# Patient Record
Sex: FEMALE | Race: BLACK OR AFRICAN AMERICAN | NOT HISPANIC OR LATINO | URBAN - METROPOLITAN AREA
[De-identification: names, ages, dates, MRNs, and addresses within clinical notes are randomized per-mention and may not be internally consistent; named-entity substitution may affect disease eponyms.]

---

## 2023-07-08 ENCOUNTER — INPATIENT (INPATIENT)
Facility: HOSPITAL | Age: 68
LOS: 2 days | Discharge: HOME | End: 2023-07-11
Attending: INTERNAL MEDICINE | Admitting: INTERNAL MEDICINE
Payer: MEDICARE

## 2023-07-08 VITALS
OXYGEN SATURATION: 98 % | DIASTOLIC BLOOD PRESSURE: 71 MMHG | RESPIRATION RATE: 18 BRPM | HEART RATE: 72 BPM | SYSTOLIC BLOOD PRESSURE: 125 MMHG | TEMPERATURE: 98 F

## 2023-07-08 LAB
ALBUMIN SERPL ELPH-MCNC: 4.5 G/DL — SIGNIFICANT CHANGE UP (ref 3.3–5)
ALP SERPL-CCNC: 65 U/L — SIGNIFICANT CHANGE UP (ref 40–120)
ALT FLD-CCNC: 14 U/L — SIGNIFICANT CHANGE UP (ref 4–33)
ANION GAP SERPL CALC-SCNC: 14 MMOL/L — SIGNIFICANT CHANGE UP (ref 7–14)
APTT BLD: 27.7 SEC — SIGNIFICANT CHANGE UP (ref 27–36.3)
AST SERPL-CCNC: 17 U/L — SIGNIFICANT CHANGE UP (ref 4–32)
BASOPHILS # BLD AUTO: 0.08 K/UL — SIGNIFICANT CHANGE UP (ref 0–0.2)
BASOPHILS NFR BLD AUTO: 0.9 % — SIGNIFICANT CHANGE UP (ref 0–2)
BILIRUB SERPL-MCNC: 0.3 MG/DL — SIGNIFICANT CHANGE UP (ref 0.2–1.2)
BUN SERPL-MCNC: 16 MG/DL — SIGNIFICANT CHANGE UP (ref 7–23)
CALCIUM SERPL-MCNC: 8.9 MG/DL — SIGNIFICANT CHANGE UP (ref 8.4–10.5)
CHLORIDE SERPL-SCNC: 100 MMOL/L — SIGNIFICANT CHANGE UP (ref 98–107)
CO2 SERPL-SCNC: 25 MMOL/L — SIGNIFICANT CHANGE UP (ref 22–31)
CREAT SERPL-MCNC: 1.15 MG/DL — SIGNIFICANT CHANGE UP (ref 0.5–1.3)
EGFR: 52 ML/MIN/1.73M2 — LOW
EOSINOPHIL # BLD AUTO: 0.22 K/UL — SIGNIFICANT CHANGE UP (ref 0–0.5)
EOSINOPHIL NFR BLD AUTO: 2.6 % — SIGNIFICANT CHANGE UP (ref 0–6)
GLUCOSE SERPL-MCNC: 90 MG/DL — SIGNIFICANT CHANGE UP (ref 70–99)
HCT VFR BLD CALC: 37.3 % — SIGNIFICANT CHANGE UP (ref 34.5–45)
HGB BLD-MCNC: 12.1 G/DL — SIGNIFICANT CHANGE UP (ref 11.5–15.5)
IANC: 5.58 K/UL — SIGNIFICANT CHANGE UP (ref 1.8–7.4)
INR BLD: 1.09 RATIO — SIGNIFICANT CHANGE UP (ref 0.88–1.16)
LYMPHOCYTES # BLD AUTO: 2.11 K/UL — SIGNIFICANT CHANGE UP (ref 1–3.3)
LYMPHOCYTES # BLD AUTO: 25.2 % — SIGNIFICANT CHANGE UP (ref 13–44)
MANUAL SMEAR VERIFICATION: SIGNIFICANT CHANGE UP
MCHC RBC-ENTMCNC: 28.1 PG — SIGNIFICANT CHANGE UP (ref 27–34)
MCHC RBC-ENTMCNC: 32.4 GM/DL — SIGNIFICANT CHANGE UP (ref 32–36)
MCV RBC AUTO: 86.7 FL — SIGNIFICANT CHANGE UP (ref 80–100)
MONOCYTES # BLD AUTO: 0.22 K/UL — SIGNIFICANT CHANGE UP (ref 0–0.9)
MONOCYTES NFR BLD AUTO: 2.6 % — SIGNIFICANT CHANGE UP (ref 2–14)
NEUTROPHILS # BLD AUTO: 5.54 K/UL — SIGNIFICANT CHANGE UP (ref 1.8–7.4)
NEUTROPHILS NFR BLD AUTO: 66.1 % — SIGNIFICANT CHANGE UP (ref 43–77)
PLAT MORPH BLD: NORMAL — SIGNIFICANT CHANGE UP
PLATELET # BLD AUTO: 118 K/UL — LOW (ref 150–400)
PLATELET COUNT - ESTIMATE: NORMAL — SIGNIFICANT CHANGE UP
POTASSIUM SERPL-MCNC: 2.7 MMOL/L — CRITICAL LOW (ref 3.5–5.3)
POTASSIUM SERPL-SCNC: 2.7 MMOL/L — CRITICAL LOW (ref 3.5–5.3)
PROT SERPL-MCNC: 7.4 G/DL — SIGNIFICANT CHANGE UP (ref 6–8.3)
PROTHROM AB SERPL-ACNC: 12.6 SEC — SIGNIFICANT CHANGE UP (ref 10.5–13.4)
RBC # BLD: 4.3 M/UL — SIGNIFICANT CHANGE UP (ref 3.8–5.2)
RBC # FLD: 13.2 % — SIGNIFICANT CHANGE UP (ref 10.3–14.5)
RBC BLD AUTO: NORMAL — SIGNIFICANT CHANGE UP
SMUDGE CELLS # BLD: PRESENT — SIGNIFICANT CHANGE UP
SODIUM SERPL-SCNC: 139 MMOL/L — SIGNIFICANT CHANGE UP (ref 135–145)
TROPONIN T, HIGH SENSITIVITY RESULT: <6 NG/L — SIGNIFICANT CHANGE UP
VARIANT LYMPHS # BLD: 2.6 % — SIGNIFICANT CHANGE UP (ref 0–6)
WBC # BLD: 8.38 K/UL — SIGNIFICANT CHANGE UP (ref 3.8–10.5)
WBC # FLD AUTO: 8.38 K/UL — SIGNIFICANT CHANGE UP (ref 3.8–10.5)

## 2023-07-08 PROCEDURE — 70498 CT ANGIOGRAPHY NECK: CPT | Mod: 26,MA

## 2023-07-08 PROCEDURE — 0042T: CPT | Mod: MA

## 2023-07-08 PROCEDURE — 70496 CT ANGIOGRAPHY HEAD: CPT | Mod: 26,MA

## 2023-07-08 PROCEDURE — 99223 1ST HOSP IP/OBS HIGH 75: CPT | Mod: FS

## 2023-07-08 RX ORDER — ASPIRIN/CALCIUM CARB/MAGNESIUM 324 MG
81 TABLET ORAL DAILY
Refills: 0 | Status: DISCONTINUED | OUTPATIENT
Start: 2023-07-08 | End: 2023-07-11

## 2023-07-08 RX ORDER — POTASSIUM CHLORIDE 20 MEQ
40 PACKET (EA) ORAL ONCE
Refills: 0 | Status: COMPLETED | OUTPATIENT
Start: 2023-07-08 | End: 2023-07-08

## 2023-07-08 RX ORDER — TETANUS TOXOID, REDUCED DIPHTHERIA TOXOID AND ACELLULAR PERTUSSIS VACCINE, ADSORBED 5; 2.5; 8; 8; 2.5 [IU]/.5ML; [IU]/.5ML; UG/.5ML; UG/.5ML; UG/.5ML
0.5 SUSPENSION INTRAMUSCULAR ONCE
Refills: 0 | Status: COMPLETED | OUTPATIENT
Start: 2023-07-08 | End: 2023-07-08

## 2023-07-08 RX ORDER — SODIUM CHLORIDE 9 MG/ML
1000 INJECTION INTRAMUSCULAR; INTRAVENOUS; SUBCUTANEOUS ONCE
Refills: 0 | Status: COMPLETED | OUTPATIENT
Start: 2023-07-08 | End: 2023-07-08

## 2023-07-08 RX ORDER — POTASSIUM CHLORIDE 20 MEQ
10 PACKET (EA) ORAL
Refills: 0 | Status: COMPLETED | OUTPATIENT
Start: 2023-07-08 | End: 2023-07-09

## 2023-07-08 NOTE — ED ADULT NURSE NOTE - NSFALLRISKINTERV_ED_ALL_ED
Assistance OOB with selected safe patient handling equipment if applicable/Communicate fall risk and risk factors to all staff, patient, and family/Orthostatic vital signs/Provide visual cue: yellow wristband, yellow gown, etc/Reinforce activity limits and safety measures with patient and family/Call bell, personal items and telephone in reach/Instruct patient to call for assistance before getting out of bed/chair/stretcher/Non-slip footwear applied when patient is off stretcher/[place] to call system/Physically safe environment - no spills, clutter or unnecessary equipment/Purposeful Proactive Rounding/Room/bathroom lighting operational, light cord in reach

## 2023-07-08 NOTE — CONSULT NOTE ADULT - ASSESSMENT
Assessment  68-year-old woman with history of hypertension, hyperlipidemia, prior syncopal event 10 years ago from exertion while exercising at the gym presenting for new onset syncope and allegedly ?family member noted right facial droop upon awakening prior to ED arrival which resolved when she presented to the ED. Fingerstick 126, other labs pending. CT head preliminary read unremarkable. Patient not tenecteplase candidate due to no deficits on exam. Not mechanical thrombectomy candidate due to no LVO.    LKW: 8pm  NIHSS: 0  mRS: 0  ABCD2: 1 (age)    Impression  Loss of consciousness with ?resolved right facial droop concerning for TIA vs syncope pending further workup.    Recommendations  -Frequent re-evaluations  -Continuous telemetry monitoring  -Serial Cardiac Enzymes with EKG  -echo, further cardiac workup as appropriate  -orthostatics  -MRI brain w/o contrast  -continue home aspirin 81  -HbA1c, lipid panel    Case to be discussed with the attending in the morning. Assessment  68-year-old woman with history of hypertension, hyperlipidemia, prior syncopal event 10 years ago from exertion while exercising at the gym presenting for new onset syncope and allegedly ?family member noted right facial droop upon awakening prior to ED arrival which resolved when she presented to the ED. Fingerstick 126, other labs pending. CT head preliminary read unremarkable. Patient not tenecteplase candidate due to no deficits on exam. Not mechanical thrombectomy candidate due to no LVO.    LKW: 8pm  NIHSS: 0  mRS: 0  ABCD2: 1 (age)    Impression  Loss of consciousness with ?resolved right facial droop concerning for TIA vs syncope pending further workup.    Recommendations  -Frequent re-evaluations  -allow for permissive HTN up to 220/110 for 24 hours followed by gradual normotension  -Continuous telemetry monitoring  -Serial Cardiac Enzymes with EKG  -echo, further cardiac workup as appropriate  -orthostatics  -MRI brain w/o contrast  -continue home aspirin 81  -HbA1c, lipid panel    Case discussed with telestroke neurologist, to be seen by neuro team in the AM.

## 2023-07-08 NOTE — ED CDU PROVIDER INITIAL DAY NOTE - ATTENDING APP SHARED VISIT CONTRIBUTION OF CARE
67 yo female with PMH HTN, HLD s/p syncopal episode while at a cookout earlier, states she was feeling very hot, had a witnessed fall with head strike. Fam member reports brief episode of faical droop PE: Well appearing, RRR, CTA BL lungs, abd soft NTND, cerebellar intact, CN II-XII grossly normal, speech normal, no facial droop, normal gait. +hematoma noted on forehead wth no active bleeding A/P MR, neuro, reassess

## 2023-07-08 NOTE — CONSULT NOTE ADULT - ATTENDING COMMENTS
patient feels wee at present, neuro exam is non focal.  reports a similar episode 3 yrs ago, also in july, after a photoshoot felt a "wave like sensation" in her head and lost consciousness.   multiple witnesses at yesterdays event, no sz like activity reported. she denies and post ictal period, loss of B/B no tongue bite, no muscle aches. had another episode 10 yrs ago of syncope after working out  impression: Loss of consciousness with ?resolved right facial droop concerning for TIA vs syncope pending further workup. similar episode 3 yrs ago also cannot r/o syncope vs sz.   plan  as above but will get MRI brain with and without cole  cardiac w/u  EEG can be done in patient or outpatient.   D/w patient and CDU staff patient feels wee at present, neuro exam is non focal.  reports a similar episode 3 yrs ago, also in july, after a photo shoot felt a "wave like sensation" in her head and lost consciousness.   multiple witnesses at yesterdays event, no sz like activity reported. she denies and post ictal period, loss of B/B no tongue bite, no muscle aches. had another episode 10 yrs ago of syncope after working out  impression: Loss of consciousness with ?resolved right facial droop concerning for TIA vs syncope pending further workup. similar episode 3 yrs ago also cannot r/o syncope vs sz.   plan  as above but will get MRI brain with and without cole  cardiac w/u  EEG

## 2023-07-08 NOTE — CONSULT NOTE ADULT - SUBJECTIVE AND OBJECTIVE BOX
Neurology - Consult Note    Spectra: [phone] ([facility]), [phone] ([facility])    HPI: Diana Naranjo is a 68-year-old woman with history of hypertension, hyperlipidemia, prior syncopal event 10 years ago from exertion while exercising at the gym presenting for new onset syncope and allegedly ?family member noted right facial droop upon awakening prior to ED arrival which resolved when she presented to the ED.    Patient seen and evaluated at bedside. Reports feeling well. Denies pain. Patient reports she was at her niece's birthday party when she lost consciousness, reports for a few minutes. She states she was taking photos and standing for a prolonged period of time. Felt hot in her head prior to losing consciousness. Denies confusion. Denies muscle fatigue, headaches, weakness/tingling/difficulties with swallowing/shortness of breath/palpitations/vision changes. Upon awaking, did not know where she was or what happened. Patient reports she is taking amlodipine, chlorthalidone and baby aspirin for preventative reasons.    LKW: 8pm  NIHSS: 0  mRS: 0  ABCD2: 1 (age)    Review of Systems:  As mentioned in HPI.  All other review of systems is negative unless indicated above.    Allergies:  No Known Allergies    PMHx/PSHx/Family Hx: As above, otherwise see below     Vitals:  T(C): 36.4 (07-08-23 @ 20:54), Max: 36.4 (07-08-23 @ 20:54)  HR: 72 (07-08-23 @ 20:54) (72 - 72)  BP: 125/71 (07-08-23 @ 20:54) (125/71 - 125/71)  RR: 18 (07-08-23 @ 20:54) (18 - 18)  SpO2: 98% (07-08-23 @ 20:54) (98% - 98%)    Physical Examination:   General - non-toxic appearing female in no acute distress    Neurologic Exam:  Mental status - Awake, Alert, Oriented to person, place, and time. Speech fluent, repetition intact. Follows simple commands.    Cranial nerves - PERRLA (4mm -> 3mm b/l), VFF, EOMI, face sensation (V1-V3) intact b/l, facial strength intact without asymmetry b/l, hearing intact b/l, palate with symmetric elevation, trapezius 5/5 strength b/l, tongue midline on protrusion with full lateral movement    Motor - Normal bulk and tone throughout. No pronator drift.  Strength testing            Deltoid      Biceps      Triceps             R            5                 5               5                  5  L             5                 5               5                   5              Hip Flexion       Knee Flexion    Knee Extension    Dorsiflexion    Plantar Flexion  R              5                         5                       5                           5                            5                           L              5                         5                        5                           5                            5                          Sensation - Light touch intact  Coordination - Finger to Nose intact b/l. No tremors appreciated    Gait and station - Normal casual gait    Labs: pending  CAPILLARY BLOOD GLUCOSE  POCT Blood Glucose.: 126 mg/dL (08 Jul 2023 21:04)    Radiology:  CT head prelim read unremarkable.  -no hemorrhage  -hyperdensities of basal ganglia  -?potential small old lacunar in left frontal

## 2023-07-08 NOTE — ED PROVIDER NOTE - OBJECTIVE STATEMENT
69 y/o F with h/o HTN, HLD s/p sudden onset syncopal episode 1 hour PTA as she was standing up from a bench in a hot environment with many people and hit her head on the concrete with the fall. As per daughter upon awakening, Mom just "stared" and a right facial droop was present that resolved upon arrival to hospital. No recent illness nor travel. Denies f/c, dizziness, CP, SOB, abdominal pain, n/v/d, dysuria, weakness, numbness, tingling. Large frontal hematoma noted. Patient is   A+Ox3, answering all questions and following commands appropriately. Although sx's resolved and NIH 0, code stroke called due to onset and window of sx's. Accompanied with daughter.

## 2023-07-08 NOTE — ED PROVIDER NOTE - ATTENDING APP SHARED VISIT CONTRIBUTION OF CARE
67 yo female with PMH HTN, HLD s/p syncopal episode while at a cookout earlier, states she was feeling very hot, had a witnessed fall with head strike. Fam member reports brief episode of faical droop PE: Well appearing, RRR, CTA BL lungs, abd soft NTND, cerebellar intact, CN II-XII grossly normal, speech normal, no facial droop, normal gait. +hematoma noted on forehead wth no active bleeding A/P Labs, imaging, medicate, reassess

## 2023-07-08 NOTE — ED ADULT TRIAGE NOTE - CHIEF COMPLAINT QUOTE
Patient c/o syncopal episode x 1 hour ago. States she was eating, felt dizzy and fell out of chair onto knees. Abrasion noted to left side of forehead, bleeding controlled. Denies dizziness, chest pain and SOB. Hx. HTN and HLD. Arrives with 20G IV to left AC.

## 2023-07-08 NOTE — ED ADULT NURSE NOTE - OBJECTIVE STATEMENT
Trice RN: 68 year old female received in intake rm8 as code stroke. Pt brought to CT area immediately. Pt AAOx4 ambulatory c/o syncopal episode stating "I felt a lot of heat in my head, I was eating then my vision got blurry and that's the last thing I remember". +head strike, swelling and abrasion noted to left forehead. Pt denies AC use. Pt denies numbness/weakness at this time. Steady gait noted. +ROM to all extremities. Pt denies dizziness/headache/chest pain/shortness of breath/n/v/d/fever. Respirations even and unlabored. PIV #22 left AC by EMS. PIV #18 right AC, labs drawn and sent. VS as noted. Neuro at bedside. Bed in lowest position, safety maintained. Trice RN: 68 year old female received in intake rm8 as code stroke. Pt brought to CT area immediately. LKW 7/8/23 20:00. Pt AAOx4 ambulatory c/o syncopal episode stating "I felt a lot of heat in my head, I was eating then my vision got blurry and that's the last thing I remember". +head strike, swelling and abrasion noted to left forehead. Pt denies AC use. Pt denies numbness/weakness at this time. Steady gait noted. +ROM to all extremities. Pt denies dizziness/headache/chest pain/shortness of breath/n/v/d/fever. Respirations even and unlabored. PIV #22 left AC by EMS. PIV #18 right AC, labs drawn and sent. VS as noted. Neuro at bedside. Bed in lowest position, safety maintained.

## 2023-07-08 NOTE — ED PROVIDER NOTE - CLINICAL SUMMARY MEDICAL DECISION MAKING FREE TEXT BOX
67 y/o F with h/o HTN, HLD s/p sudden onset syncopal episode 1 hour PTA as she was standing up from a bench in a hot environment with many people and hit her head on the concrete with the fall. As per daughter upon awakening, Mom just "stared" and a right facial droop was present that resolved upon arrival to hospital. No recent illness nor travel. Denies f/c, dizziness, CP, SOB, abdominal pain, n/v/d, dysuria, weakness, numbness, tingling. Large frontal hematoma noted. Patient is   A+Ox3, answering all questions and following commands appropriately. Although sx's resolved and NIH 0, code stroke called due to onset and window of sx's. Accompanied with daughter. 69 y/o F with h/o HTN, HLD s/p sudden onset syncopal episode 1 hour PTA as she was standing up from a bench in a hot environment with many people and hit her head on the concrete with the fall. As per daughter upon awakening, Mom just "stared" and a right facial droop was present that resolved upon arrival to hospital. No recent illness nor travel. Denies f/c, dizziness, CP, SOB, abdominal pain, n/v/d, dysuria, weakness, numbness, tingling. Large frontal hematoma noted. Patient is   A+Ox3, answering all questions and following commands appropriately. Although sx's resolved and NIH 0, code stroke called due to onset and window of sx's. Accompanied with daughter.  Plan: Proceed with Stroke w/u/labs, EKG, Neuro consult and reassess   Results: CT/CT head and neck neg for acute stroke. Seen by Neuro with recs for MRI brain without contrast and Echo. K+ level 2.7-Potassium supplementation ordered. Patient accepted to CDU under MARIA FERNANDA [facility]

## 2023-07-08 NOTE — ED PROVIDER NOTE - NS ED ATTENDING STATEMENT MOD
This was a shared visit with the PRAKASH. I reviewed and verified the documentation and independently performed the documented:

## 2023-07-08 NOTE — ED CDU PROVIDER INITIAL DAY NOTE - CLINICAL SUMMARY MEDICAL DECISION MAKING FREE TEXT BOX
69 y/o F with h/o HTN, HLD s/p sudden onset syncopal episode 1 hour PTA as she was standing up from a bench in a hot environment with many people and hit her head on the concrete with the fall. As per daughter upon awakening, Mom just "stared" and a right facial droop was present that resolved upon arrival to hospital. No recent illness nor travel. Denies f/c, dizziness, CP, SOB, abdominal pain, n/v/d, dysuria, weakness, numbness, tingling. Large frontal hematoma noted. Patient is   A+Ox3, answering all questions and following commands appropriately. Although sx's resolved and NIH 0, code stroke called due to onset and window of sx's. Accompanied with daughter.    CDU PA: CT/CT head and neck neg for acute stroke. Seen by Neuro with recs for MRI brain without contrast, neuro checks, tele monitoring and Echocardiogram. K+ level 2.7-Potassium supplementation ordered. Will repeat potassium in am 69 y/o F with h/o HTN, HLD s/p sudden onset syncopal episode 1 hour PTA as she was standing up from a bench in a hot environment with many people and hit her head on the concrete with the fall. As per daughter upon awakening, Mom just "stared" and a right facial droop was present that resolved upon arrival to hospital. No recent illness nor travel. Denies f/c, dizziness, CP, SOB, abdominal pain, n/v/d, dysuria, weakness, numbness, tingling. Large frontal hematoma noted. Patient is   A+Ox3, answering all questions and following commands appropriately. Although sx's resolved and NIH 0, code stroke called due to onset and window of sx's. Accompanied with daughter.    CDU PA: HPI as above. CT/CT head and neck neg for acute stroke. Seen by Neuro with recs for MRI brain without contrast, neuro checks, tele monitoring and Echocardiogram. K+ level 2.7-potassium supplementation ordered. Will repeat potassium in am, check HGA1C and lipid panel 67 y/o F with h/o HTN, HLD s/p sudden onset syncopal episode 1 hour PTA as she was standing up from a bench in a hot environment with many people and hit her head on the concrete with the fall. As per daughter upon awakening, Mom just "stared" and a right facial droop was present that resolved upon arrival to hospital. No recent illness nor travel. Denies f/c, dizziness, CP, SOB, abdominal pain, n/v/d, dysuria, weakness, numbness, tingling. Large frontal hematoma noted. Patient is   A+Ox3, answering all questions and following commands appropriately. Although sx's resolved and NIH 0, code stroke called due to onset and window of sx's. Accompanied with daughter.    CDU PA: HPI as above. CT/CTA head and neck neg for acute stroke. Seen by Neuro with recs for MRI brain without contrast, neuro checks, tele monitoring and Echocardiogram. K+ level 2.7-potassium supplementation ordered. Will repeat potassium in am, check HGA1C and lipid panel

## 2023-07-08 NOTE — ED CDU PROVIDER INITIAL DAY NOTE - OBJECTIVE STATEMENT
69 y/o F with h/o HTN, HLD s/p sudden onset syncopal episode 1 hour PTA as she was standing up from a bench in a hot environment with many people and hit her head on the concrete with the fall. As per daughter upon awakening, Mom just "stared" and a right facial droop was present that resolved upon arrival to hospital. No recent illness nor travel. Denies f/c, dizziness, CP, SOB, abdominal pain, n/v/d, dysuria, weakness, numbness, tingling. Large frontal hematoma noted. Patient is   A+Ox3, answering all questions and following commands appropriately. Although sx's resolved and NIH 0, code stroke called due to onset and window of sx's. Accompanied with daughter.    CDU PA: HPI as above. CT/CT head and neck neg for acute stroke. Seen by Neuro with recs for MRI brain without contrast, neuro checks, tele monitoring and Echocardiogram. K+ level 2.7-potassium supplementation ordered. Will repeat potassium in am 69 y/o F with h/o HTN, HLD s/p sudden onset syncopal episode 1 hour PTA as she was standing up from a bench in a hot environment with many people and hit her head on the concrete with the fall. As per daughter upon awakening, Mom just "stared" and a right facial droop was present that resolved upon arrival to hospital. No recent illness nor travel. Denies f/c, dizziness, CP, SOB, abdominal pain, n/v/d, dysuria, weakness, numbness, tingling. Large frontal hematoma noted. Patient is   A+Ox3, answering all questions and following commands appropriately. Although sx's resolved and NIH 0, code stroke called due to onset and window of sx's. Accompanied with daughter.    CDU PA: HPI as above. CT/CT head and neck neg for acute stroke. Seen by Neuro with recs for MRI brain without contrast, neuro checks, tele monitoring and Echocardiogram. K+ level 2.7-potassium supplementation ordered. Will repeat potassium in am, check HGA1C and lipid panel 67 y/o F with h/o HTN, HLD s/p sudden onset syncopal episode 1 hour PTA as she was standing up from a bench in a hot environment with many people and hit her head on the concrete with the fall. As per daughter upon awakening, Mom just "stared" and a right facial droop was present that resolved upon arrival to hospital. No recent illness nor travel. Denies f/c, dizziness, CP, SOB, abdominal pain, n/v/d, dysuria, weakness, numbness, tingling. Large frontal hematoma noted. Patient is   A+Ox3, answering all questions and following commands appropriately. Although sx's resolved and NIH 0, code stroke called due to onset and window of sx's. Accompanied with daughter.    CDU PA: HPI as above. CT/CTA head and neck neg for acute stroke. Seen by Neuro with recs for MRI brain without contrast, neuro checks, tele monitoring and Echocardiogram. K+ level 2.7-potassium supplementation ordered. Will repeat potassium in am, check HGA1C and lipid panel

## 2023-07-09 DIAGNOSIS — R55 SYNCOPE AND COLLAPSE: ICD-10-CM

## 2023-07-09 LAB
ALBUMIN SERPL ELPH-MCNC: 4.1 G/DL — SIGNIFICANT CHANGE UP (ref 3.3–5)
ALP SERPL-CCNC: 63 U/L — SIGNIFICANT CHANGE UP (ref 40–120)
ALT FLD-CCNC: 13 U/L — SIGNIFICANT CHANGE UP (ref 4–33)
ANION GAP SERPL CALC-SCNC: 13 MMOL/L — SIGNIFICANT CHANGE UP (ref 7–14)
AST SERPL-CCNC: 18 U/L — SIGNIFICANT CHANGE UP (ref 4–32)
BILIRUB SERPL-MCNC: 0.3 MG/DL — SIGNIFICANT CHANGE UP (ref 0.2–1.2)
BUN SERPL-MCNC: 15 MG/DL — SIGNIFICANT CHANGE UP (ref 7–23)
CALCIUM SERPL-MCNC: 9.3 MG/DL — SIGNIFICANT CHANGE UP (ref 8.4–10.5)
CHLORIDE SERPL-SCNC: 103 MMOL/L — SIGNIFICANT CHANGE UP (ref 98–107)
CHOLEST SERPL-MCNC: 243 MG/DL — HIGH
CO2 SERPL-SCNC: 25 MMOL/L — SIGNIFICANT CHANGE UP (ref 22–31)
CREAT SERPL-MCNC: 0.89 MG/DL — SIGNIFICANT CHANGE UP (ref 0.5–1.3)
EGFR: 70 ML/MIN/1.73M2 — SIGNIFICANT CHANGE UP
GLUCOSE SERPL-MCNC: 122 MG/DL — HIGH (ref 70–99)
HDLC SERPL-MCNC: 100 MG/DL — SIGNIFICANT CHANGE UP
LIPID PNL WITH DIRECT LDL SERPL: 133 MG/DL — HIGH
MAGNESIUM SERPL-MCNC: 2.2 MG/DL — SIGNIFICANT CHANGE UP (ref 1.6–2.6)
NON HDL CHOLESTEROL: 143 MG/DL — HIGH
PHOSPHATE SERPL-MCNC: 3.2 MG/DL — SIGNIFICANT CHANGE UP (ref 2.5–4.5)
POTASSIUM SERPL-MCNC: 3.8 MMOL/L — SIGNIFICANT CHANGE UP (ref 3.5–5.3)
POTASSIUM SERPL-SCNC: 3.8 MMOL/L — SIGNIFICANT CHANGE UP (ref 3.5–5.3)
PROT SERPL-MCNC: 7 G/DL — SIGNIFICANT CHANGE UP (ref 6–8.3)
SODIUM SERPL-SCNC: 141 MMOL/L — SIGNIFICANT CHANGE UP (ref 135–145)
TRIGL SERPL-MCNC: 52 MG/DL — SIGNIFICANT CHANGE UP
TROPONIN T, HIGH SENSITIVITY RESULT: <6 NG/L — SIGNIFICANT CHANGE UP
TSH SERPL-MCNC: 0.94 UIU/ML — SIGNIFICANT CHANGE UP (ref 0.27–4.2)

## 2023-07-09 PROCEDURE — G1004: CPT

## 2023-07-09 PROCEDURE — 93306 TTE W/DOPPLER COMPLETE: CPT | Mod: 26

## 2023-07-09 PROCEDURE — 99233 SBSQ HOSP IP/OBS HIGH 50: CPT | Mod: FS

## 2023-07-09 PROCEDURE — 70553 MRI BRAIN STEM W/O & W/DYE: CPT | Mod: 26,MG

## 2023-07-09 PROCEDURE — 99223 1ST HOSP IP/OBS HIGH 75: CPT

## 2023-07-09 RX ORDER — POTASSIUM CHLORIDE 20 MEQ
40 PACKET (EA) ORAL ONCE
Refills: 0 | Status: COMPLETED | OUTPATIENT
Start: 2023-07-09 | End: 2023-07-09

## 2023-07-09 RX ORDER — ACETAMINOPHEN 500 MG
975 TABLET ORAL EVERY 6 HOURS
Refills: 0 | Status: DISCONTINUED | OUTPATIENT
Start: 2023-07-09 | End: 2023-07-11

## 2023-07-09 RX ORDER — DIAZEPAM 5 MG
5 TABLET ORAL ONCE
Refills: 0 | Status: DISCONTINUED | OUTPATIENT
Start: 2023-07-09 | End: 2023-07-09

## 2023-07-09 RX ADMIN — Medication 40 MILLIEQUIVALENT(S): at 05:40

## 2023-07-09 RX ADMIN — Medication 975 MILLIGRAM(S): at 00:45

## 2023-07-09 RX ADMIN — Medication 5 MILLIGRAM(S): at 11:41

## 2023-07-09 RX ADMIN — Medication 81 MILLIGRAM(S): at 11:41

## 2023-07-09 RX ADMIN — Medication 975 MILLIGRAM(S): at 01:45

## 2023-07-09 RX ADMIN — Medication 100 MILLIEQUIVALENT(S): at 01:34

## 2023-07-09 RX ADMIN — SODIUM CHLORIDE 1000 MILLILITER(S): 9 INJECTION INTRAMUSCULAR; INTRAVENOUS; SUBCUTANEOUS at 00:03

## 2023-07-09 RX ADMIN — Medication 40 MILLIEQUIVALENT(S): at 00:04

## 2023-07-09 RX ADMIN — TETANUS TOXOID, REDUCED DIPHTHERIA TOXOID AND ACELLULAR PERTUSSIS VACCINE, ADSORBED 0.5 MILLILITER(S): 5; 2.5; 8; 8; 2.5 SUSPENSION INTRAMUSCULAR at 00:04

## 2023-07-09 RX ADMIN — Medication 100 MILLIEQUIVALENT(S): at 00:03

## 2023-07-09 RX ADMIN — Medication 100 MILLIEQUIVALENT(S): at 02:46

## 2023-07-09 NOTE — H&P ADULT - PROBLEM SELECTOR PLAN 1
-Pt p/w syncopal episode preceding by feeling of heat in head and tunneling of vision. No witnessed seizure activity.  -MRI neg for acute infarct  -TTE with no valvular pathology or segmental wall abnormalities  -check orthostatics   -Appreciate neuro recs, pending EEG to eval for seizures   -monitor on tele   -can consider further cardiac w/u including stress test and ILR as outpt if eeg neg -Pt p/w syncopal episode preceding by feeling of heat in head and tunneling of vision. No witnessed seizure activity.  -MRI neg for acute infarct  -TTE with no valvular pathology or segmental wall abnormalities  -orthostatics negative  -Appreciate neuro recs, pending EEG to eval for seizures   -monitor on tele   -can consider further cardiac w/u including stress test and ILR as outpt if eeg neg

## 2023-07-09 NOTE — H&P ADULT - HISTORY OF PRESENT ILLNESS
68-year-old woman with history of hypertension, hyperlipidemia, prior syncopal event 10 years ago from exertion while exercising at the gym presenting for new onset syncope and allegedly ?family member noted right facial droop upon awakening prior to ED arrival which resolved when she presented to the ED. Patient reports she was at her niece's birthday party when she lost consciousness, reports for a few minutes. She states she was taking photos and standing for a prolonged period of time. Felt hot in her head and then had blackening of her vision prior to losing consciousness. Denies confusion. Denies muscle fatigue, headaches, weakness/tingling/difficulties with swallowing/shortness of breath/palpitations/vision changes. Upon awaking, did not know where she was or what happened. Pt admits that she was drinking much water throughout the day. Patient reports she is taking amlodipine, chlorthalidone and baby aspirin for preventative reasons. Pt was admitted to the CDU. She had an MRI that was negative for acute infarct. Also had a TTE that was unremarkable.

## 2023-07-09 NOTE — H&P ADULT - NSHPLABSRESULTS_GEN_ALL_CORE
(07-08 @ 22:00)                      12.1  8.38 )-----------( 118                 37.3    Neutrophils = 5.54 (66.1%)  Lymphocytes = 2.11 (25.2%)  Eosinophils = 0.22 (2.6%)  Basophils = 0.08 (0.9%)  Monocytes = 0.22 (2.6%)  Bands = --%    07-09    141  |  103  |  15  ----------------------------<  122<H>  3.8   |  25  |  0.89    Ca    9.3      09 Jul 2023 08:23  Phos  3.2     07-09  Mg     2.20     07-09    TPro  7.0  /  Alb  4.1  /  TBili  0.3  /  DBili  x   /  AST  18  /  ALT  13  /  AlkPhos  63  07-09    ( 08 Jul 2023 22:00 )   PT: 12.6 sec;   INR: 1.09 ratio;       PTT:27.7 sec      Urinalysis Basic - ( 09 Jul 2023 08:23 )    Color: x / Appearance: x / SG: x / pH: x  Gluc: 122 mg/dL / Ketone: x  / Bili: x / Urobili: x   Blood: x / Protein: x / Nitrite: x   Leuk Esterase: x / RBC: x / WBC x   Sq Epi: x / Non Sq Epi: x / Bacteria: x    < from: Transthoracic Echocardiogram (07.09.23 @ 14:31) >    CONCLUSIONS:  1. Mitral annular calcification, otherwise normal mitral  valve. Minimal mitral regurgitation.  2. Normal left ventricular internal dimensions and wall  thicknesses.  3. Normal left ventricular systolic function. No segmental  wall motion abnormalities.  4. Mild diastolic dysfunction (Stage I).  5. Normal right ventricular size and function.  6. A bubble study was performed with the intravenous  injection of agitated saline contrast and was inconclusive  regarding the presence of an interatrial shunt.  No obvious cardiac source of embolus was identified on this  transthoracic study.  If clinical suspicion is high,  consider GUILLE for further evaluation.    < end of copied text >    < from: MR Head w/wo IV Cont (07.09.23 @ 12:37) >    IMPRESSION: No acute intracranial hemorrhage or evidence of acute   ischemia. No abnormal intracranial enhancement.    Mild-moderate chronic white matter microvascular type changes.    Redemonstration of mild to moderate left-sided frontal scalp soft tissue   swelling.    < end of copied text >          Labs personally reviewed  Imaging reviewed   EKG: NSR with 1st degree heart block, no acute st changes

## 2023-07-09 NOTE — ED ADULT NURSE REASSESSMENT NOTE - NS ED NURSE REASSESS COMMENT FT1
Pt received from main ED, was oriented to room and unit. call bell provided Pt denies any pain, SOB, dizziness, weakness. sinus rhythm with 1degree on tele. potasium administered as ordered. safety maintained. will continue to monitor
Pt a&ox4, steady gait, Denies CP, SOB, dyspnea, or pain at this time. Respirations are even and unlabored, no signs of respiratory distress. NSR on cardiac monitor. Medicated as per MD orders.

## 2023-07-09 NOTE — ED CDU PROVIDER SUBSEQUENT DAY NOTE - CROS ED NEURO POS
pt's daughter noted transient right facial droop (see HPI) s/p pt "coming to" after syncopal episode w/ head trauma

## 2023-07-09 NOTE — H&P ADULT - ASSESSMENT
68-year-old woman with history of hypertension, hyperlipidemia presents s/p syncopal episode admitted for further w/u

## 2023-07-09 NOTE — ED ADULT NURSE REASSESSMENT NOTE - NSFALLUNIVINTERV_ED_ALL_ED
Bed/Stretcher in lowest position, wheels locked, appropriate side rails in place/Call bell, personal items and telephone in reach/Instruct patient to call for assistance before getting out of bed/chair/stretcher/Non-slip footwear applied when patient is off stretcher/[place] to call system/Physically safe environment - no spills, clutter or unnecessary equipment/Purposeful proactive rounding/Room/bathroom lighting operational, light cord in reach

## 2023-07-09 NOTE — H&P ADULT - NSICDXFAMILYHX_GEN_ALL_CORE_FT
FAMILY HISTORY:  Mother  Still living? Unknown  FHx: hypertension, Age at diagnosis: Age Unknown

## 2023-07-09 NOTE — ED CDU PROVIDER SUBSEQUENT DAY NOTE - HISTORY
67 yo female, PMH HTN, HLD, presented to the ED on 7/8/23 s/p sudden onset syncopal episode 1 hour PTA:  pt was standing up from a bench in a hot environment with many people around her including pt's daughter who witnessed event; per daughter, pt hit her head on the concrete with the fall. As per daughter upon awakening, pt just "stared" and a right facial droop was present that resolved upon arrival to hospital. No recent illness nor travel. Denies f/c, dizziness, CP, SOB, abdominal pain, n/v/d, dysuria, weakness, numbness, tingling. Large frontal hematoma noted. In ED, pt A+Ox3, answering all questions and following commands appropriately. Although sx's resolved and NIH 0, code stroke called due to onset and window of sx's. Accompanied with daughter.  CT/CTA head and neck neg for acute stroke. Seen by Neuro with recs for MRI brain without contrast, echo, neuro checks, tele monitoring and Echocardiogram. K+ level 2.7-potassium supplementation ordered. Will repeat potassium in am, check HGA1C and lipid panel; pt sent to CDU for continued care.  In the interim, pt objectively noted to be resting comfortably; pt has been clinically stable; no issues thus far.

## 2023-07-09 NOTE — ED CDU PROVIDER SUBSEQUENT DAY NOTE - CLINICAL SUMMARY MEDICAL DECISION MAKING FREE TEXT BOX
Tele monitoring, neuro checks, MRI head / echo / recommendations as per Neuro team following patient; electrolyte repletion, repeat labs, supportive care, general observation care / monitoring.

## 2023-07-09 NOTE — H&P ADULT - NSHPREVIEWOFSYSTEMS_GEN_ALL_CORE
CONSTITUTIONAL:  No weight loss, fever, chills, weakness or fatigue.  HEENT:  Eyes:  No visual loss, blurred vision, double vision or yellow sclerae. Ears, Nose, Throat:  No hearing loss, sneezing, congestion, runny nose or sore throat.  SKIN:  No rash or itching.  CARDIOVASCULAR: No chest pain, chest pressure or chest discomfort. No palpitations or edema.  RESPIRATORY:  No shortness of breath, cough or sputum.  GASTROINTESTINAL:  No anorexia, nausea, vomiting or diarrhea. No abdominal pain or blood.  GENITOURINARY:  Denies hematuria, dysuria.   NEUROLOGICAL:  +syncope No headache, dizziness, paralysis, ataxia, numbness or tingling in the extremities. No change in bowel or bladder control.  MUSCULOSKELETAL:  No muscle, back pain, joint pain or stiffness.  PSYCHIATRIC:  No history of depression or anxiety.  ENDOCRINOLOGIC:  No reports of sweating, cold or heat intolerance. No polyuria or polydipsia.  ALLERGIES:  No history of asthma, hives, eczema or rhinitis.

## 2023-07-09 NOTE — H&P ADULT - PROBLEM SELECTOR PLAN 3
-elevated ascvd score, started on mod intensity statin      #thrombocytopenia  -118K, unknown baseline  -trend CBC

## 2023-07-09 NOTE — ED CDU PROVIDER DISPOSITION NOTE - CLINICAL COURSE
67 yo female, PMH HTN, HLD, presented to the ED on 7/8/23 s/p sudden onset syncopal episode 1 hour PTA:  pt was standing up from a bench in a hot environment with many people around her including pt's daughter who witnessed event; per daughter, pt hit her head on the concrete with the fall. As per daughter upon awakening, pt just "stared" and a right facial droop was present that resolved upon arrival to hospital. No recent illness nor travel. Denies f/c, dizziness, CP, SOB, abdominal pain, n/v/d, dysuria, weakness, numbness, tingling. Large frontal hematoma noted. In ED, pt A+Ox3, answering all questions and following commands appropriately. Although sx's resolved and NIH 0, code stroke called due to onset and window of sx's. Accompanied with daughter.  CT/CTA head and neck neg for acute stroke. Seen by Neuro with recs for MRI brain without contrast, echo, neuro checks, tele monitoring and Echocardiogram. K+ level 2.7-potassium supplementation ordered. Will repeat potassium in am, check HGA1C and lipid panel; pt sent to CDU for continued care.  In the interim, pt objectively noted to be resting comfortably; pt has been clinically stable; no issues thus far. on re-evaluation in the morning, pt was feeling fine with no medical complaints. per neuro, MRI normal but would need to r/o seizure, recommending eeg. pt admitted.

## 2023-07-09 NOTE — H&P ADULT - NSHPPHYSICALEXAM_GEN_ALL_CORE
GENERAL APPEARANCE: Well developed, alert and cooperative. NAD.   HEENT:  PERRL, EOMI. External auditory canals normal, hearing grossly intact. Forehead hematoma with overlying dressing   NECK: Neck supple, non-tender  CARDIAC: Normal S1 and S2. No S3, S4 or murmurs. Rhythm is regular.  LUNGS: Clear to auscultation and percussion without rales, rhonchi, wheezing or diminished breath sounds.  ABDOMEN: Positive bowel sounds. Soft, nondistended, nontender. No guarding or rebound.   MUSCULOSKELETAL: ROM intact spine and extremities. No joint erythema or tenderness.   BACK: normal posture, no spinal deformity, symmetry of spinal muscles, without tenderness  EXTREMITIES: No significant deformity or joint abnormality. No edema. Peripheral pulses intact.   NEUROLOGICAL: CN II-XII intact. Strength and sensation symmetric and intact throughout.   SKIN: no rashes noted  PSYCHIATRIC: AOx3.Normal affect and behavior.

## 2023-07-10 DIAGNOSIS — I10 ESSENTIAL (PRIMARY) HYPERTENSION: ICD-10-CM

## 2023-07-10 DIAGNOSIS — R55 SYNCOPE AND COLLAPSE: ICD-10-CM

## 2023-07-10 DIAGNOSIS — E78.5 HYPERLIPIDEMIA, UNSPECIFIED: ICD-10-CM

## 2023-07-10 DIAGNOSIS — Z29.9 ENCOUNTER FOR PROPHYLACTIC MEASURES, UNSPECIFIED: ICD-10-CM

## 2023-07-10 DIAGNOSIS — Z90.710 ACQUIRED ABSENCE OF BOTH CERVIX AND UTERUS: Chronic | ICD-10-CM

## 2023-07-10 PROCEDURE — 95718 EEG PHYS/QHP 2-12 HR W/VEEG: CPT

## 2023-07-10 RX ORDER — ENOXAPARIN SODIUM 100 MG/ML
40 INJECTION SUBCUTANEOUS EVERY 24 HOURS
Refills: 0 | Status: DISCONTINUED | OUTPATIENT
Start: 2023-07-10 | End: 2023-07-11

## 2023-07-10 RX ORDER — ATORVASTATIN CALCIUM 80 MG/1
40 TABLET, FILM COATED ORAL AT BEDTIME
Refills: 0 | Status: DISCONTINUED | OUTPATIENT
Start: 2023-07-10 | End: 2023-07-11

## 2023-07-10 RX ORDER — ONDANSETRON 8 MG/1
4 TABLET, FILM COATED ORAL EVERY 8 HOURS
Refills: 0 | Status: DISCONTINUED | OUTPATIENT
Start: 2023-07-10 | End: 2023-07-11

## 2023-07-10 RX ORDER — AMLODIPINE BESYLATE 2.5 MG/1
1 TABLET ORAL
Refills: 0 | DISCHARGE

## 2023-07-10 RX ORDER — ASPIRIN/CALCIUM CARB/MAGNESIUM 324 MG
1 TABLET ORAL
Refills: 0 | DISCHARGE

## 2023-07-10 RX ORDER — LANOLIN ALCOHOL/MO/W.PET/CERES
3 CREAM (GRAM) TOPICAL AT BEDTIME
Refills: 0 | Status: DISCONTINUED | OUTPATIENT
Start: 2023-07-10 | End: 2023-07-11

## 2023-07-10 RX ORDER — AMLODIPINE BESYLATE 2.5 MG/1
5 TABLET ORAL DAILY
Refills: 0 | Status: DISCONTINUED | OUTPATIENT
Start: 2023-07-10 | End: 2023-07-11

## 2023-07-10 RX ADMIN — Medication 81 MILLIGRAM(S): at 12:33

## 2023-07-10 RX ADMIN — AMLODIPINE BESYLATE 5 MILLIGRAM(S): 2.5 TABLET ORAL at 05:27

## 2023-07-10 RX ADMIN — Medication 975 MILLIGRAM(S): at 23:19

## 2023-07-10 RX ADMIN — ATORVASTATIN CALCIUM 40 MILLIGRAM(S): 80 TABLET, FILM COATED ORAL at 22:26

## 2023-07-10 RX ADMIN — Medication 975 MILLIGRAM(S): at 22:26

## 2023-07-10 NOTE — PATIENT PROFILE ADULT - FALL HARM RISK - HARM RISK INTERVENTIONS

## 2023-07-10 NOTE — EEG REPORT - NS EEG TEXT BOX
MAXI ANDERSON [facility]-[id number]     Study Date: 07-10-23  Duration: 3 hr 13 min  --------------------------------------------------------------------------------------------------  History:  CC/ HPI Patient is a 68y old  Female who presents with a chief complaint of syncope (10 Jul 2023 12:53)    MEDICATIONS  (STANDING):  amLODIPine   Tablet 5 milliGRAM(s) Oral daily  aspirin enteric coated 81 milliGRAM(s) Oral daily  atorvastatin 40 milliGRAM(s) Oral at bedtime  enoxaparin Injectable 40 milliGRAM(s) SubCutaneous every 24 hours    --------------------------------------------------------------------------------------------------  Study Interpretation:    [[[Abbreviation Key:  PDR=alpha rhythm/posterior dominant rhythm. A-P=anterior posterior.  Amplitude: ‘very low’:<20; ‘low’:20-49; ‘medium’:; ‘high’:>150uV.  Persistence for periodic/rhythmic patterns (% of epoch) ‘rare’:<1%; ‘occasional’:1-10%; ‘frequent’:10-50%; ‘abundant’:50-90%; ‘continuous’:>90%.  Persistence for sporadic discharges: ‘rare’:<1/hr; ‘occasional’:1/min-1/hr; ‘frequent’:>1/min; ‘abundant’:>1/10 sec.  RPP=rhythmic and periodic patterns; GRDA=generalized rhythmic delta activity; FIRDA=frontal intermittent GRDA; LRDA=lateralized rhythmic delta activity; TIRDA=temporal intermittent rhythmic delta activity;  LPD=PLED=lateralized periodic discharges; GPD=generalized periodic discharges; BIPDs =bilateral independent periodic discharges; Mf=multifocal; SIRPDs=stimulus induced rhythmic, periodic, or ictal appearing discharges; BIRDs=brief potentially ictal rhythmic discharges >4 Hz, lasting .5-10s; PFA (paroxysmal bursts >13 Hz or =8 Hz <10s).  Modifiers: +F=with fast component; +S=with spike component; +R=with rhythmic component.  S-B=burst suppression pattern.  Max=maximal. N1-drowsy; N2-stage II sleep; N3-slow wave sleep. SSS/BETS=small sharp spikes/benign epileptiform transients of sleep. HV=hyperventilation; PS=photic stimulation]]]    Daily EEG Visual Analysis    FINDINGS:      Background:  Continuity: Continuous  Symmetry: Symmetric  Posterior dominant rhythm (PDR): 9 Hz, reactive to eye closure. Symmetric low-amplitude frontal beta in wakefulness.  State Change: Present  Voltage: Normal  Anterior Posterior Gradient: Present  Other background findings: None  Breach: Absent    Background Slowing:  Generalized slowing: None  Focal slowing: None    State Changes:   Drowsiness is characterized by fragmentation, attenuation, and slowing of the background activity.  Stage 2 sleep is characterized by symmetric K complexes and sleep spindles.     Interictal Findings:  None    Electrographic and Electroclinical seizures:  None    Other Clinical Events:  None    Activation Procedures:   Hyperventilation is not performed.    Photic stimulation is performed and does not elicit any abnormalities.      Artifacts:  Intermittent myogenic and movement artifacts are present.    EKG:  Single-lead EKG shows regular rhythm.    EEG Classification / Summary:  Normal EEG in the awake, drowsy, and asleep states.   No focal or epileptiform abnormalities are captured.     Clinical Impression:  A normal EEG neither refutes nor supports a diagnosis of epilepsy.          -------------------------------------------------------------------------------------------------------  [facility] EEG Reading Room Ph#: [phone]  Epilepsy Answering Service after 5PM and before 8:30AM: Ph#: [phone]    Ghislaine Heard MD  Attending Physician, [facility] [place]

## 2023-07-10 NOTE — CONSULT NOTE ADULT - ASSESSMENT
68-year-old woman with history of hypertension, hyperlipidemia, prior syncopal event 10 years ago from exertion while exercising at the gym presenting for new onset syncope     #Syncope  -prodrome appears vasovagal in nature  -no tele events  -TTE w normal LVEF  -MRI w no acute pathology  -EEG per neuro    #HTN  -resume outpt meds  -avoid diuretics    75 minutes spent on total encounter; more than 50% of the visit was spent counseling and/or coordinating care by the attending physician.

## 2023-07-10 NOTE — CONSULT NOTE ADULT - SUBJECTIVE AND OBJECTIVE BOX
CARDIOLOGY CONSULT - Dr. Muhammad  Date of Service: 7/10/2023      HPI:  68-year-old woman with history of hypertension, hyperlipidemia, prior syncopal event 10 years ago from exertion while exercising at the gym presenting for new onset syncope and allegedly ?family member noted right facial droop upon awakening prior to ED arrival which resolved when she presented to the ED. Patient reports she was at her niece's birthday party when she lost consciousness, reports for a few minutes. She states she was taking photos and standing for a prolonged period of time. Felt hot in her head and then had blackening of her vision prior to losing consciousness. Denies confusion. Denies muscle fatigue, headaches, weakness/tingling/difficulties with swallowing/shortness of breath/palpitations/vision changes. Upon awaking, did not know where she was or what happened. Pt admits that she was drinking much water throughout the day. Patient reports she is taking amlodipine, chlorthalidone and baby aspirin for preventative reasons. Pt was admitted to the CDU. She had an MRI that was negative for acute infarct. Also had a TTE that was unremarkable. (09 Jul 2023 22:00)      PAST MEDICAL & SURGICAL HISTORY:  HTN (hypertension)      HLD (hyperlipidemia)      S/P hysterectomy              PREVIOUS DIAGNOSTIC TESTING:    [ ] Echocardiogram:  [ ]  Catheterization:  [ ] Stress Test:  	    MEDICATIONS:  MEDICATIONS  (STANDING):  amLODIPine   Tablet 5 milliGRAM(s) Oral daily  aspirin enteric coated 81 milliGRAM(s) Oral daily  atorvastatin 40 milliGRAM(s) Oral at bedtime  enoxaparin Injectable 40 milliGRAM(s) SubCutaneous every 24 hours      FAMILY HISTORY:  FHx: hypertension (Mother)        SOCIAL HISTORY:    [ ] Non-smoker  [ ] Smoker  [ ] Alcohol    Allergies    No Known Allergies    Intolerances    	    REVIEW OF SYSTEMS:  CONSTITUTIONAL: No fever, weight loss, or fatigue  EYES: No eye pain, visual disturbances, or discharge  ENMT:  No difficulty hearing, tinnitus, vertigo; No sinus or throat pain  NECK: No pain or stiffness  RESPIRATORY: No cough, wheezing, chills or hemoptysis; No Shortness of Breath  CARDIOVASCULAR: No chest pain, palpitations, passing out, dizziness, or leg swelling  GASTROINTESTINAL: No abdominal or epigastric pain. No nausea, vomiting, or hematemesis; No diarrhea or constipation. No melena or hematochezia.  GENITOURINARY: No dysuria, frequency, hematuria, or incontinence  NEUROLOGICAL: No headaches, memory loss, loss of strength, numbness, or tremors  SKIN: No itching, burning, rashes, or lesions   	    [ ] All others negative	  [ ] Unable to obtain    PHYSICAL EXAM:  T(C): 36.8 (07-10-23 @ 10:53), Max: 37 (07-09-23 @ 21:31)  HR: 65 (07-10-23 @ 10:53) (59 - 73)  BP: 140/70 (07-10-23 @ 10:53) (110/65 - 140/70)  RR: 18 (07-10-23 @ 10:53) (18 - 18)  SpO2: 100% (07-10-23 @ 10:53) (99% - 100%)  Wt(kg): --  I&O's Summary      Appearance: Normal	  Psychiatry: A & O x 3, Mood & affect appropriate  HEENT:   Normal oral mucosa, PERRL, EOMI	  Lymphatic: No lymphadenopathy  Cardiovascular: Normal S1 S2,RRR, No JVD, No murmurs  Respiratory: Lungs clear to auscultation	  Gastrointestinal:  Soft, Non-tender, + BS	  Skin: No rashes, No ecchymoses, No cyanosis	  Neurologic: Non-focal  Extremities: Normal range of motion, No clubbing, cyanosis or edema  Vascular: Peripheral pulses palpable 2+ bilaterally    TELEMETRY: 	    ECG:  	  RADIOLOGY:  OTHER: 	  	  LABS:	 	    CARDIAC MARKERS:                                  12.1   8.38  )-----------( 118      ( 08 Jul 2023 22:00 )             37.3     07-09    141  |  103  |  15  ----------------------------<  122<H>  3.8   |  25  |  0.89    Ca    9.3      09 Jul 2023 08:23  Phos  3.2     07-09  Mg     2.20     07-09    TPro  7.0  /  Alb  4.1  /  TBili  0.3  /  DBili  x   /  AST  18  /  ALT  13  /  AlkPhos  63  07-09    PT/INR - ( 08 Jul 2023 22:00 )   PT: 12.6 sec;   INR: 1.09 ratio         PTT - ( 08 Jul 2023 22:00 )  PTT:27.7 sec  proBNP:   Lipid Profile:   HgA1c:   TSH:     ASSESSMENT/PLAN: 	              70 minutes spent on total encounter; more than 50% of the visit was spent counseling and/or coordinating care by the attending physician.   There is 1 Wet Read(s) to document. There are no Wet Read(s) to document.

## 2023-07-11 ENCOUNTER — TRANSCRIPTION ENCOUNTER (OUTPATIENT)
Age: 68
End: 2023-07-11

## 2023-07-11 VITALS
SYSTOLIC BLOOD PRESSURE: 140 MMHG | DIASTOLIC BLOOD PRESSURE: 78 MMHG | OXYGEN SATURATION: 100 % | TEMPERATURE: 98 F | HEART RATE: 68 BPM | RESPIRATION RATE: 16 BRPM

## 2023-07-11 LAB
ANION GAP SERPL CALC-SCNC: 14 MMOL/L — SIGNIFICANT CHANGE UP (ref 7–14)
BUN SERPL-MCNC: 23 MG/DL — SIGNIFICANT CHANGE UP (ref 7–23)
CALCIUM SERPL-MCNC: 9.7 MG/DL — SIGNIFICANT CHANGE UP (ref 8.4–10.5)
CHLORIDE SERPL-SCNC: 97 MMOL/L — LOW (ref 98–107)
CO2 SERPL-SCNC: 24 MMOL/L — SIGNIFICANT CHANGE UP (ref 22–31)
CREAT SERPL-MCNC: 1 MG/DL — SIGNIFICANT CHANGE UP (ref 0.5–1.3)
EGFR: 61 ML/MIN/1.73M2 — SIGNIFICANT CHANGE UP
GLUCOSE SERPL-MCNC: 104 MG/DL — HIGH (ref 70–99)
HCT VFR BLD CALC: 41.6 % — SIGNIFICANT CHANGE UP (ref 34.5–45)
HGB BLD-MCNC: 13.3 G/DL — SIGNIFICANT CHANGE UP (ref 11.5–15.5)
MAGNESIUM SERPL-MCNC: 2.2 MG/DL — SIGNIFICANT CHANGE UP (ref 1.6–2.6)
MCHC RBC-ENTMCNC: 27.6 PG — SIGNIFICANT CHANGE UP (ref 27–34)
MCHC RBC-ENTMCNC: 32 GM/DL — SIGNIFICANT CHANGE UP (ref 32–36)
MCV RBC AUTO: 86.3 FL — SIGNIFICANT CHANGE UP (ref 80–100)
NRBC # BLD: 0 /100 WBCS — SIGNIFICANT CHANGE UP (ref 0–0)
NRBC # FLD: 0 K/UL — SIGNIFICANT CHANGE UP (ref 0–0)
PHOSPHATE SERPL-MCNC: 3.6 MG/DL — SIGNIFICANT CHANGE UP (ref 2.5–4.5)
PLATELET # BLD AUTO: 185 K/UL — SIGNIFICANT CHANGE UP (ref 150–400)
POTASSIUM SERPL-MCNC: 3.2 MMOL/L — LOW (ref 3.5–5.3)
POTASSIUM SERPL-SCNC: 3.2 MMOL/L — LOW (ref 3.5–5.3)
RBC # BLD: 4.82 M/UL — SIGNIFICANT CHANGE UP (ref 3.8–5.2)
RBC # FLD: 13.2 % — SIGNIFICANT CHANGE UP (ref 10.3–14.5)
SODIUM SERPL-SCNC: 135 MMOL/L — SIGNIFICANT CHANGE UP (ref 135–145)
WBC # BLD: 6.6 K/UL — SIGNIFICANT CHANGE UP (ref 3.8–10.5)
WBC # FLD AUTO: 6.6 K/UL — SIGNIFICANT CHANGE UP (ref 3.8–10.5)

## 2023-07-11 RX ORDER — ATORVASTATIN CALCIUM 80 MG/1
1 TABLET, FILM COATED ORAL
Qty: 30 | Refills: 0
Start: 2023-07-11 | End: 2023-08-09

## 2023-07-11 RX ORDER — POTASSIUM CHLORIDE 20 MEQ
40 PACKET (EA) ORAL ONCE
Refills: 0 | Status: COMPLETED | OUTPATIENT
Start: 2023-07-11 | End: 2023-07-11

## 2023-07-11 RX ORDER — CHLORTHALIDONE 50 MG
1 TABLET ORAL
Refills: 0 | DISCHARGE

## 2023-07-11 RX ADMIN — Medication 81 MILLIGRAM(S): at 13:18

## 2023-07-11 RX ADMIN — AMLODIPINE BESYLATE 5 MILLIGRAM(S): 2.5 TABLET ORAL at 05:52

## 2023-07-11 RX ADMIN — Medication 40 MILLIEQUIVALENT(S): at 13:18

## 2023-07-11 NOTE — DISCHARGE NOTE PROVIDER - CARE PROVIDER_API CALL
Ben Muhammad  Cardiovascular Disease  [address], [address]  [place] [address]  Phone: [phone]  Fax: [phone]  Follow Up Time: 2 weeks

## 2023-07-11 NOTE — PROGRESS NOTE ADULT - ASSESSMENT
68-year-old woman with history of hypertension, hyperlipidemia, prior syncopal event 10 years ago from exertion while exercising at the gym presenting for new onset syncope     #Syncope  -prodrome appears vasovagal in nature  -no tele events  -TTE w normal LVEF  -MRI w no acute pathology  -EEG per neuro    #HTN  -cont amlo  -avoid diuretics    no objection to dcp    35 minutes spent on total encounter; more than 50% of the visit was spent counseling and/or coordinating care by the attending physician.    
68-year-old woman with history of hypertension, hyperlipidemia presents s/p syncopal episode admitted for further w/u     #Syncope.   ·  Plan: -Pt p/w syncopal episode preceding by feeling of heat in head and tunneling of vision. No witnessed seizure activity.  -MRI neg for acute infarct  -TTE with no valvular pathology or segmental wall abnormalities  -orthostatics negative  -Appreciate neuro recs, pending EEG to eval for seizures   seen by cardio   Likely vasovagal     # HTN (hypertension).    -c/w amlodipine  d/c diuretics     # HLD (hyperlipidemia).   c/w statin     #thrombocytopenia  -118K, unknown baseline  -trend CBC.    dispo: likley vasovagal episode, if neuro clears , and EEG done , plan for d/c

## 2023-07-11 NOTE — DISCHARGE NOTE PROVIDER - NSDCMRMEDTOKEN_GEN_ALL_CORE_FT
amLODIPine 10 mg oral tablet: 1 tab(s) orally once a day  aspirin 81 mg oral tablet: 1 tab(s) orally once a day  chlorthalidone 25 mg oral tablet: 1 tab(s) orally once a day   amLODIPine 10 mg oral tablet: 1 tab(s) orally once a day  aspirin 81 mg oral tablet: 1 tab(s) orally once a day  atorvastatin 40 mg oral tablet: 1 tab(s) orally once a day (at bedtime)

## 2023-07-11 NOTE — DISCHARGE NOTE PROVIDER - NSFOLLOWUPCLINICS_GEN_ALL_ED_FT
[facility]  Neurology [facility]  [address] [place] [address]  Phone: [phone]  Fax: [phone]  Follow Up Time: 1 week

## 2023-07-11 NOTE — DISCHARGE NOTE NURSING/CASE MANAGEMENT/SOCIAL WORK - NSDCVIVACCINE_GEN_ALL_CORE_FT
Tdap; 09-Jul-2023 00:04; Unique Jordan (RN); [employer]; [id number] (Exp. Date: 19-Sep-2025); IntraMuscular; Deltoid Left.; 0.5 milliLiter(s); VIS (VIS Published: 09-May-2013, VIS Presented: 09-Jul-2023);

## 2023-07-11 NOTE — CHART NOTE - NSCHARTNOTEFT_GEN_A_CORE
EEG reviewed.    EEG Classification / Summary:  Normal EEG in the awake, drowsy, and asleep states.   No focal or epileptiform abnormalities are captured.     Clinical Impression:  A normal EEG neither refutes nor supports a diagnosis of epilepsy.      Impression: LOC episode likely syncope, low suspicion for seizure. ?resolved R facial droop    Recommendations:  [x] MRI brain w/w/o - mild-mod white matter changes, L frontal scalp swelling  [x] EEG - normal  [] cardiac workup per primary team  [x] c/w home aspirin 81mg daily and atorvastatin 40mg daily    Neurology signing off at this time. Please call neurology [phone] for further questions EEG reviewed.    EEG Classification / Summary:  Normal EEG in the awake, drowsy, and asleep states.   No focal or epileptiform abnormalities are captured.     Clinical Impression:  A normal EEG neither refutes nor supports a diagnosis of epilepsy.      Impression: LOC episode likely syncope, low suspicion for seizure. ?resolved R facial droop    Recommendations:  [x] MRI brain w/w/o - mild-mod white matter changes, L frontal scalp swelling  [x] EEG - normal  [] cardiac workup per primary team  [x] c/w home aspirin 81mg daily and atorvastatin 40mg daily    Neurology signing off at this time. Please call neurology [phone] for further questions  Thank you

## 2023-07-11 NOTE — DISCHARGE NOTE NURSING/CASE MANAGEMENT/SOCIAL WORK - PATIENT PORTAL LINK FT
You can access the FollowMyHealth Patient Portal offered by [facility] by registering at the following website: [email or web address]. By joining [facility]’s FollowMyHealth portal, you will also be able to view your health information using other applications (apps) compatible with our system.

## 2023-07-11 NOTE — DISCHARGE NOTE PROVIDER - NSDCCPCAREPLAN_GEN_ALL_CORE_FT
PRINCIPAL DISCHARGE DIAGNOSIS  Diagnosis: Syncope  Assessment and Plan of Treatment: Your fall was like likey due to hypotension. Your echocardiogram was normal. Your MRI brain showed no bleeding. You have a left frontal hematoma, continue dressing changes. Your EEG was normal and showed no seizure activity. Please follow up with your primary care physician regarding your hospitalization. It is safe to continue home aspirin 81mg daily and atorvastatin 40mg daily. Monitor for worsening scalp hematoma and follow up with your pcp      SECONDARY DISCHARGE DIAGNOSES  Diagnosis: HTN (hypertension)  Assessment and Plan of Treatment: Hold your diuretic medication and only take amlodipine. Continue current blood pressure medication regimen as directed. Monitor for any visual changes, headaches or dizziness.  Monitor blood pressure regularly.  Follow up with your PCP for further management for high blood pressure, please call to make appointment within 1 week of discharge    Diagnosis: Facial droop  Assessment and Plan of Treatment:      PRINCIPAL DISCHARGE DIAGNOSIS  Diagnosis: Syncope  Assessment and Plan of Treatment: Your fall was like likey due to hypotension. Your echocardiogram was normal. Your MRI brain showed no bleeding. You have a left frontal hematoma, continue dressing changes. Your EEG was normal and showed no seizure activity. Please follow up with your primary care physician regarding your hospitalization. It is safe to continue home aspirin 81mg daily and atorvastatin 40mg daily. Monitor for worsening scalp hematoma and follow up with your pcp. Follow up with cardiology for outpatient stress test and possible loop recorder for further cardiac workup.      SECONDARY DISCHARGE DIAGNOSES  Diagnosis: HTN (hypertension)  Assessment and Plan of Treatment: Hold your diuretic medication and only take amlodipine. Continue current blood pressure medication regimen as directed. Monitor for any visual changes, headaches or dizziness.  Monitor blood pressure regularly.  Follow up with your PCP for further management for high blood pressure, please call to make appointment within 1 week of discharge

## 2023-07-11 NOTE — PROGRESS NOTE ADULT - SUBJECTIVE AND OBJECTIVE BOX
Patient is a 68y old  Female who presents with a chief complaint of syncope (10 Jul 2023 12:53)      INTERVAL HPI/OVERNIGHT EVENTS: seen and examined   T(C): 36.8 (07-10-23 @ 21:06), Max: 37 (07-10-23 @ 05:27)  HR: 62 (07-10-23 @ 21:06) (60 - 70)  BP: 144/70 (07-10-23 @ 21:06) (110/65 - 144/70)  RR: 16 (07-10-23 @ 21:06) (16 - 18)  SpO2: 99% (07-10-23 @ 21:06) (98% - 100%)  Wt(kg): --  I&O's Summary      PAST MEDICAL & SURGICAL HISTORY:  HTN (hypertension)      HLD (hyperlipidemia)      S/P hysterectomy          SOCIAL HISTORY  Alcohol:  Tobacco:  Illicit substance use:    FAMILY HISTORY:    REVIEW OF SYSTEMS:  CONSTITUTIONAL: No fever, weight loss, or fatigue  EYES: No eye pain, visual disturbances, or discharge  ENMT:  No difficulty hearing, tinnitus, vertigo; No sinus or throat pain  NECK: No pain or stiffness  RESPIRATORY: No cough, wheezing, chills or hemoptysis; No shortness of breath  CARDIOVASCULAR: No chest pain, palpitations, dizziness, or leg swelling  GASTROINTESTINAL: No abdominal or epigastric pain. No nausea, vomiting, or hematemesis; No diarrhea or constipation. No melena or hematochezia.  GENITOURINARY: No dysuria, frequency, hematuria, or incontinence  NEUROLOGICAL: No headaches, memory loss, loss of strength, numbness, or tremors  SKIN: No itching, burning, rashes, or lesions   LYMPH NODES: No enlarged glands  ENDOCRINE: No heat or cold intolerance; No hair loss  MUSCULOSKELETAL: No joint pain or swelling; No muscle, back, or extremity pain  PSYCHIATRIC: No depression, anxiety, mood swings, or difficulty sleeping  HEME/LYMPH: No easy bruising, or bleeding gums  ALLERY AND IMMUNOLOGIC: No hives or eczema    RADIOLOGY & ADDITIONAL TESTS:    Imaging Personally Reviewed:  [ ] YES  [ ] NO    Consultant(s) Notes Reviewed:  [ ] YES  [ ] NO    PHYSICAL EXAM:  GENERAL: NAD, well-groomed, well-developed  HEAD:  Atraumatic, Normocephalic  EYES: EOMI, PERRLA, conjunctiva and sclera clear  ENMT: No tonsillar erythema, exudates, or enlargement; Moist mucous membranes, Good dentition, No lesions  NECK: Supple, No JVD, Normal thyroid  NERVOUS SYSTEM:  Alert & Oriented X3, Good concentration; Motor Strength 5/5 B/L upper and lower extremities; DTRs 2+ intact and symmetric  CHEST/LUNG: Clear to percussion bilaterally; No rales, rhonchi, wheezing, or rubs  HEART: Regular rate and rhythm; No murmurs, rubs, or gallops  ABDOMEN: Soft, Nontender, Nondistended; Bowel sounds present  EXTREMITIES:  2+ Peripheral Pulses, No clubbing, cyanosis, or edema  LYMPH: No lymphadenopathy noted  SKIN: No rashes or lesions    LABS:    07-09    141  |  103  |  15  ----------------------------<  122<H>  3.8   |  25  |  0.89    Ca    9.3      09 Jul 2023 08:23  Phos  3.2     07-09  Mg     2.20     07-09    TPro  7.0  /  Alb  4.1  /  TBili  0.3  /  DBili  x   /  AST  18  /  ALT  13  /  AlkPhos  63  07-09      Urinalysis Basic - ( 09 Jul 2023 08:23 )    Color: x / Appearance: x / SG: x / pH: x  Gluc: 122 mg/dL / Ketone: x  / Bili: x / Urobili: x   Blood: x / Protein: x / Nitrite: x   Leuk Esterase: x / RBC: x / WBC x   Sq Epi: x / Non Sq Epi: x / Bacteria: x      CAPILLARY BLOOD GLUCOSE            Urinalysis Basic - ( 09 Jul 2023 08:23 )    Color: x / Appearance: x / SG: x / pH: x  Gluc: 122 mg/dL / Ketone: x  / Bili: x / Urobili: x   Blood: x / Protein: x / Nitrite: x   Leuk Esterase: x / RBC: x / WBC x   Sq Epi: x / Non Sq Epi: x / Bacteria: x        MEDICATIONS  (STANDING):  amLODIPine   Tablet 5 milliGRAM(s) Oral daily  aspirin enteric coated 81 milliGRAM(s) Oral daily  atorvastatin 40 milliGRAM(s) Oral at bedtime  enoxaparin Injectable 40 milliGRAM(s) SubCutaneous every 24 hours    MEDICATIONS  (PRN):  acetaminophen     Tablet .. 975 milliGRAM(s) Oral every 6 hours PRN Mild Pain (1 - 3), Moderate Pain (4 - 6)  aluminum hydroxide/magnesium hydroxide/simethicone Suspension 30 milliLiter(s) Oral every 4 hours PRN Dyspepsia  melatonin 3 milliGRAM(s) Oral at bedtime PRN Insomnia  ondansetron Injectable 4 milliGRAM(s) IV Push every 8 hours PRN Nausea and/or Vomiting      Care Discussed with Consultants/Other Providers [ ] YES  [ ] NO
CARDIOLOGY FOLLOW UP - Dr. Muhammad  Date of Service: 7/11/2023  CC: no cp/sob    Review of Systems:  Constitutional: No fever, weight loss, or fatigue  Respiratory: No cough, wheezing, or hemoptysis, no shortness of breath  Cardiovascular: No chest pain, palpitations, passing out, dizziness, or leg swelling  Gastrointestinal: No abd or epigastric pain. No nausea, vomiting, or hematemesis; no diarrhea or consiptaiton, no melena or hematochezia  Vascular: No edema     TELEMETRY:    PHYSICAL EXAM:  T(C): 36.7 (07-11-23 @ 05:00), Max: 36.8 (07-10-23 @ 21:06)  HR: 63 (07-11-23 @ 05:00) (62 - 65)  BP: 130/74 (07-11-23 @ 05:00) (117/68 - 144/70)  RR: 17 (07-11-23 @ 05:00) (16 - 17)  SpO2: 100% (07-11-23 @ 05:00) (98% - 100%)  Wt(kg): --  I&O's Summary      Appearance: Normal	  Cardiovascular: Normal S1 S2,RRR, No JVD, No murmurs  Respiratory: Lungs clear to auscultation	  Gastrointestinal:  Soft, Non-tender, + BS	  Extremities: Normal range of motion, No clubbing, cyanosis or edema  Vascular: Peripheral pulses palpable 2+ bilaterally       Home Medications:  amLODIPine 10 mg oral tablet: 1 tab(s) orally once a day (10 Jul 2023 00:26)  aspirin 81 mg oral tablet: 1 tab(s) orally once a day (10 Jul 2023 00:34)  chlorthalidone 25 mg oral tablet: 1 tab(s) orally once a day (10 Jul 2023 00:26)        MEDICATIONS  (STANDING):  amLODIPine   Tablet 5 milliGRAM(s) Oral daily  aspirin enteric coated 81 milliGRAM(s) Oral daily  atorvastatin 40 milliGRAM(s) Oral at bedtime  enoxaparin Injectable 40 milliGRAM(s) SubCutaneous every 24 hours        EKG:  RADIOLOGY:  DIAGNOSTIC TESTING:  [ ] Echocardiogram:  [ ] Catherterization:  [ ] Stress Test:  OTHER:     LABS:	 	                          13.3   6.60  )-----------( x        ( 11 Jul 2023 10:38 )             41.6                 CARDIAC MARKERS:

## 2023-07-14 PROBLEM — E78.5 HYPERLIPIDEMIA, UNSPECIFIED: Chronic | Status: ACTIVE | Noted: 2023-07-08

## 2023-07-14 PROBLEM — I10 ESSENTIAL (PRIMARY) HYPERTENSION: Chronic | Status: ACTIVE | Noted: 2023-07-08

## 2023-07-16 PROBLEM — Z00.00 ENCOUNTER FOR PREVENTIVE HEALTH EXAMINATION: Status: ACTIVE | Noted: 2023-07-16

## 2023-07-19 ENCOUNTER — TRANSCRIPTION ENCOUNTER (OUTPATIENT)
Age: 68
End: 2023-07-19

## 2023-07-19 ENCOUNTER — APPOINTMENT (OUTPATIENT)
Dept: NEUROLOGY | Facility: CLINIC | Age: 68
End: 2023-07-19
Payer: MEDICARE

## 2023-07-19 ENCOUNTER — NON-APPOINTMENT (OUTPATIENT)
Age: 68
End: 2023-07-19

## 2023-07-19 VITALS
SYSTOLIC BLOOD PRESSURE: 130 MMHG | WEIGHT: 170 LBS | BODY MASS INDEX: 29.02 KG/M2 | HEIGHT: 64 IN | HEART RATE: 67 BPM | DIASTOLIC BLOOD PRESSURE: 77 MMHG

## 2023-07-19 VITALS
BODY MASS INDEX: 29.02 KG/M2 | HEART RATE: 67 BPM | HEIGHT: 64 IN | DIASTOLIC BLOOD PRESSURE: 77 MMHG | WEIGHT: 170 LBS | SYSTOLIC BLOOD PRESSURE: 130 MMHG

## 2023-07-19 DIAGNOSIS — Z87.09 PERSONAL HISTORY OF OTHER DISEASES OF THE RESPIRATORY SYSTEM: ICD-10-CM

## 2023-07-19 DIAGNOSIS — R55 SYNCOPE AND COLLAPSE: ICD-10-CM

## 2023-07-19 DIAGNOSIS — Z82.49 FAMILY HISTORY OF ISCHEMIC HEART DISEASE AND OTHER DISEASES OF THE CIRCULATORY SYSTEM: ICD-10-CM

## 2023-07-19 DIAGNOSIS — Z78.9 OTHER SPECIFIED HEALTH STATUS: ICD-10-CM

## 2023-07-19 DIAGNOSIS — Z86.79 PERSONAL HISTORY OF OTHER DISEASES OF THE CIRCULATORY SYSTEM: ICD-10-CM

## 2023-07-19 PROCEDURE — 99203 OFFICE O/P NEW LOW 30 MIN: CPT

## 2023-07-19 RX ORDER — AMLODIPINE BESYLATE 10 MG/1
10 TABLET ORAL DAILY
Refills: 0 | Status: ACTIVE | COMMUNITY

## 2023-07-19 RX ORDER — ATORVASTATIN CALCIUM 40 MG/1
40 TABLET, FILM COATED ORAL DAILY
Refills: 0 | Status: ACTIVE | COMMUNITY

## 2023-07-19 RX ORDER — ASPIRIN 81 MG
81 TABLET, DELAYED RELEASE (ENTERIC COATED) ORAL DAILY
Refills: 0 | Status: ACTIVE | COMMUNITY

## 2023-07-19 NOTE — DISCUSSION/SUMMARY
[FreeTextEntry1] : Overall she is neurologically intact.\par \par To summarize, she presented to [facility] on 7/8/23, after losing consciousness. The episode occurred while she was outside in the heat, she began to "feel hot", then noticed visual changes, and when she stood up, she lost consciousness. Workup including: MRI brain, TTE, and EEG were unremarkable. The etiology of the episode was most consistent with vasovagal syncope, TIA or seizure seem very unlikely.\par \par We discussed the importance of good hydration.\par \par She has a cardiology follow up appointment next week. \par \par She can continue to take Aspirin 81 mg daily, if there are no intolerances.\par \par She can follow up on an as needed basis. I hope she remains free of serious trouble.\par \par

## 2023-07-19 NOTE — HISTORY OF PRESENT ILLNESS
[FreeTextEntry1] : She is a 68 year old lady.\par \par HPI from [facility] 7/8/23:\par She has a history of hypertension, hyperlipidemia, prior syncopal event 10 years ago from exertion while exercising at the gym presenting for new onset syncope and allegedly ?family member noted right facial droop upon awakening prior to ED arrival which resolved when she presented to the ED.\par Patient reports she was at her niece's birthday party when she lost consciousness, reports for a few minutes. She states she was taking photos and standing for a prolonged period of time. Felt hot in her head prior to losing consciousness. Denies confusion. Denies muscle fatigue, headaches, weakness/tingling/difficulties with swallowing/shortness of breath/palpitations/vision changes. Upon awaking, did not know where she was or what happened. Patient reports she is taking amlodipine, chlorthalidone and baby aspirin for preventative reasons.\par \par Workup at [facility] includes:\par - MRI brain w/w/o - mild-mod white matter changes, L frontal scalp swelling\par - CTA Head and neck - unremarkable\par -TTE w normal LVEF\par - EEG - normal\par \par She presents to the office today. She has some tenderness from where she hit her head, and is otherwise doing well. She denies any focal neurologic symptoms.\par \par PCP Dr. Miller Fabian

## 2023-07-19 NOTE — PHYSICAL EXAM
[General Appearance - Alert] : alert [General Appearance - In No Acute Distress] : in no acute distress [Oriented To Time, Place, And Person] : oriented to person, place, and time [Impaired Insight] : insight and judgment were intact [Affect] : the affect was normal [Sclera] : the sclera and conjunctiva were normal [Extraocular Movements] : extraocular movements were intact [Full Visual Field] : full visual field [Outer Ear] : the ears and nose were normal in appearance [Examination Of The Oral Cavity] : the lips and gums were normal [Neck Appearance] : the appearance of the neck was normal [Neck Cervical Mass (___cm)] : no neck mass was observed [Exaggerated Use Of Accessory Muscles For Inspiration] : no accessory muscle use [Abnormal Walk] : normal gait [Nail Clubbing] : no clubbing  or cyanosis of the fingernails [Musculoskeletal - Swelling] : no joint swelling seen [Motor Tone] : muscle strength and tone were normal [Skin Color & Pigmentation] : normal skin color and pigmentation [Skin Turgor] : normal skin turgor [] : no rash [FreeTextEntry1] : Neurologic examination:\par \par Mental status:\par \par The patient is alert, attentive, and oriented. Speech is clear and fluent with good comprehension.\par \par Cranial nerves:\par \par CN II: Visual fields are full to confrontation. \par \par CN III, IV, VI: At primary gaze, there is no eye deviation.EOMI. No ptosis\par \par CN V: Facial sensation is intact bilaterally.\par \par CN VII: Face is symmetric with normal eye closure and smile.\par \par CN VII: Hearing is grossly intact.\par \par CN IX, X: Palate elevates symmetrically. Phonation is normal.\par \par CN XI: Head turning and shoulder shrug are intact\par \par CN XII: Tongue is midline with normal movements and no atrophy.\par \par Motor:\par \par There is no pronator drift of out-stretched arms. Muscle bulk and tone are normal. Strength is full bilaterally.\par \par Sensory:\par \par Light touch intact in fingers and toes.\par \par Coordination:\par \par Fine finger movements are intact. There is no dysmetria on finger-to-nose.  \par \par Gait/Stance:\par \par Gait and tandem gait are normal. Romberg is absent.

## 2024-03-25 NOTE — ED PROVIDER NOTE - DISPOSITION TYPE
[FreeTextEntry1] : pt ppm stable abn ekg with IVANIA f/u with dr. damon  cont ranolazine er 500 q12 for angina  cont rosuvastatin 10 mg po qhs  get PPM checked  will get bloodwork  increase hydration use socks get up slowly tension exercise  f/u in 4 months   
OBSERVATION

## 2024-05-03 NOTE — ED CDU PROVIDER SUBSEQUENT DAY NOTE - CARDIOVASCULAR [+], MLM
SYNCOPE